# Patient Record
Sex: FEMALE | Race: WHITE | Employment: FULL TIME | URBAN - METROPOLITAN AREA
[De-identification: names, ages, dates, MRNs, and addresses within clinical notes are randomized per-mention and may not be internally consistent; named-entity substitution may affect disease eponyms.]

---

## 2018-08-13 ENCOUNTER — HOSPITAL ENCOUNTER (OUTPATIENT)
Dept: NUTRITION | Age: 28
Discharge: HOME | End: 2018-08-13
Payer: COMMERCIAL

## 2018-08-13 PROCEDURE — 97802 MEDICAL NUTRITION INDIV IN: CPT

## 2018-08-13 NOTE — PROGRESS NOTES
[address] [place] [address] 51, 45 [facility], [place], 70 [facility]  Phone: [phone]  Fax: [phone]   Nutrition Assessment  Medical Nutrition Therapy   Outpatient Initial Evaluation         Patient Name: Ki Mcintosh : 1990   Treatment Diagnosis: Morbid obesity   Referral Source: Stefani Burt MD [facility]): 2018     Gender: female Age: 33 y.o. Ht: 64 in Wt: 240  lb  kg   BMI: 41.2 BMR   Male  BMR Female 1800   Anthropometrics Assessment: BMI indicates obese III. Past Medical History includes: Adrenal hyperplasia, morbid obesity     Pertinent Medications:   Vit D, birth control     Biochemical Data:        Subjective/Assessment:   Pt in today r/t unintentional wt gain. Pt states that she has gained 20# in the past 2 years after moving to the [facility] from [place]. She lives with her boyfriend and he cooks dinner most nights of the week. She states that she has a stressful FT career. She is currently recovering from a broken ankle. Prior to the injury, pt was exercising with a  1 day/wk, which is the only physical activity she gets. Pt and boyfriend have fast food twice per week. She drinks coffee with creamer, water/sparkling water, and <1 x per week she has a glass of wine or 1 beer. Current Eating Patterns: B: 7:30 am - 3 eggs or protein shake  L: 1:30 pm - leftovers or protein shake  D: after work - chicken and vegetable stir howard  Sometimes pt will have a mini Joe's cup as a snack or dessert     Estimate Needs   Calories:  1800 Protein: 135 Carbs: 158 Fat: 70   Kcal/day  g/day  g/day  g/day        percent: 30  35  35               Education & Recommendations provided: Discussed the Healthy Plate Method and appropriate portion sizes of different food groups. Explained the importance of combining carbohydrates with protein at meals and reviewed foods that contain each nutrient.  Emphasized the importance of consistent meal time intervals throughout the day to improve metabolism. Explained calorie density and empty calories to assist pt with understanding portion sizes and limiting excess calories. Encouraged pt to begin tracking food into nutrition krystina. Handouts Provided: [x]  Carbohydrates  [x]  Protein  []  Fiber  []  Serving Sizes  []  Meal and Snack Ideas  []  Food Journals []  Diabetes  []  Cholesterol  []  Sodium  [x]  Gen Nutr Guidelines  []  SBGM Guidelines  [x]  Others: Snacks   Information Reviewed with: Pt   Readiness to Change Stage: []  Pre-contemplative    [x]  Contemplative  []  Preparation               []  Action                  []  Maintenance   Potential Barriers to Learning: []  Decline in memory    []  Language barrier   []  Other:  []  Emotional                  []  Limited mobility  []  Lack of motivation     [] Vision, hearing or cognitive impairment   Expected Compliance: Fair/Poor due to pt being taught and still carrying the belief that she needs to severely limit calories (800 kcals less than BMR per prior doctor) to lose weight.      Nutritional Goal - To promote lifestyle changes to result in:    [x]  Weight loss  []  Improved diabetic control  []  Decreased cholesterol levels  []  Decreased blood pressure  []  Weight maintenance []  Preventing any interactions associated with food allergies  []  Adequate weight gain toward goal weight  []  Other:        Patient Goals:  PIJRL goals      Dietitian Signature: Cirilo Barrow MS, RD Date: 8/13/2018   Follow-up: Wed Sep 5th @ 6:30 pm Time: 9:50 AM

## 2018-09-10 ENCOUNTER — HOSPITAL ENCOUNTER (OUTPATIENT)
Dept: NUTRITION | Age: 28
Discharge: HOME | End: 2018-09-10
Payer: COMMERCIAL

## 2018-09-10 PROCEDURE — 97803 MED NUTRITION INDIV SUBSEQ: CPT

## 2018-09-10 NOTE — PROGRESS NOTES
NUTRITION  FOLLOW-UP TREATMENT NOTE Patient Name: Oscar Oquendo         Date: 9/10/2018 : 1990    YES/NO Patient  Verified Diagnosis: Morbid obesity In time:   8:00            Out time:   8:30 Total Treatment Time (min):   30 min [address] Changes in medication or medical history? Any new allergies, surgeries or procedures? NO    If yes, update Summary List  
EEN changed to 1500 kcals/day, 113 g protein/day 131 g CHO/day and 58 g/fat per day. Pt in today for follow up. Pt has a dx of adrenal hyperplasia which mimicks PCOS. She has not changed her diet at all, but she did begin tracking her nutrition in [employer] krystina. She states that her kcals are widely varied between 6907-3387. She also went on vacation since last visit. She is a member of [employer] and plans to look into yoga classes offered there. She also plans to set up her appt with PT r/t her broken ankle. See initial note for food recall, as pt has not changed anything in her diet. Current Wt: 244 (w/out boot) Previous Wt: 240 (w/boot) Wt Change: +4#+ Achievement of Goals: 1. Limit CHO's to 30-45 g/meal and 15-20 g/snack = not met, continue 2. Track meals/snacks for 4 out of 7 days = met, continue 3. Always balance CHO's with protein = not met, continue 4. Eat meal or snack within 1-2 hours of waking and every 3-4 hrs thereafter = met, continue Patient Education:  [x]  Review current plan with patient [x]  Other: Encouraged pt to seek out activities that she can do while recovering from broken ankle such as yoga, which she states is offered at her gym and to send A1C results for evaluation. Handouts/ Information Provided: []  Carbohydrates 
[]  Protein []  Fiber 
[]  Serving Sizes []  Fluids 
[]  General guidelines []  Diabetes []  Cholesterol 
[]  Sodium []  SBGM []  Food Journals 
[x]  Others: Healthy tips for eating out New Patient Goals: 1. Continue tracking in [employer] 3-4 days/wk 2. Stick within ~100 kcals of goal (1500/day) 3. Send A1C results by e-mail PLAN [x]  Continue on current plan []  Follow-up PRN  
[]  Discharge due to :   
[x]  Next appt: Fri Oct 19th@ 8 am  
 
Dietitian: Chencho Amaya MS, RD Date: 9/10/2018 Time: 8:40 AM

## 2018-10-19 ENCOUNTER — APPOINTMENT (OUTPATIENT)
Dept: NUTRITION | Age: 28
End: 2018-10-19

## 2018-10-19 ENCOUNTER — HOSPITAL ENCOUNTER (OUTPATIENT)
Dept: PHYSICAL THERAPY | Age: 28
Discharge: HOME | End: 2018-10-19
Payer: COMMERCIAL

## 2018-10-19 PROCEDURE — 97110 THERAPEUTIC EXERCISES: CPT

## 2018-10-19 PROCEDURE — 97161 PT EVAL LOW COMPLEX 20 MIN: CPT

## 2018-10-19 NOTE — PROGRESS NOTES
[address] PHYSICAL THERAPY 
[address] [place] [address], [place] 201,[facility], 70 [facility] - Phone: [phone]  Fax: [phone] PLAN OF CARE / STATEMENT OF MEDICAL NECESSITY FOR PHYSICAL THERAPY SERVICES Patient Name: Junior Painter : 1990 Medical  
Diagnosis: Left foot pain [M79.672] Treatment Diagnosis: Left foot pain [M79.672] Onset Date: 2018 Referral Source: Lieutenant Nathaly MD Start of Care [facility] [place]): 10/19/2018 Prior Hospitalization: See medical history Provider #: [id number] Prior Level of Function: Unlimited walking/stairs/work duties. Recreational exercise at gym 2 x/wk including running on TM, weight lifting Comorbidities: asthma Medications: Verified on Patient Summary List  
The Plan of Care and following information is based on the information from the initial evaluation.  
=========================================================================================== Assessment / key information:  Pt is a 33y.o. year old female with subjective complaints of L foot pain s/p anterior process fracture of calcaneus 18. Pt states in boot and NWB x 3 months. Progressive wb'in in boot x 1 month. Gradual WBAT in sneaker x 1 month. Now presents with c/o weakness and stiffness limiting walking, stairs (especially descending), and has been unable to resume normal gym activities. Current pain is rated as 0 to 8/10. FOTO score= 60/100 indicating 40% impairment to functional activities. Today's evaulation is significant for: Gait pattern grossly WNL short distances. Pt with impaired strength to L ankle: DF 4+, PF 4/5, Inv/ev 4/5. Impaired A/PROM left ankle:  DF 2/4 degrees, PF 62/66, inv 44/45*, ev 18/18*. Impaired proprioception and ankle stability as per SLS L 2 to 14 seconds, eyes open. Ligamentous laxity testing WNL. Min TTP to lateral calcaneocuboid joint line. These findings are supportive for diagnosis of L foot/ankle pain. Pt will be a good candidate for skilled PT to address these impairments and promote return to normal ADLs and functional mobility for improved quality of life. 
=========================================================================================== Eval Complexity: History MEDIUM  Complexity : 1-2 comorbidities / personal factors will impact the outcome/ POC ;  Examination  MEDIUM Complexity : 3 Standardized tests and measures addressing body structure, function, activity limitation and / or participation in recreation ; Presentation LOW Complexity : Stable, uncomplicated ;  Decision Making MEDIUM Complexity : FOTO score of 26-74; Overall Complexity LOW Problem List: pain affecting function, decrease ROM, decrease strength, impaired gait/ balance, decrease ADL/ functional abilitiies, decrease activity tolerance and decrease flexibility/ joint mobility Treatment Plan may include any combination of the following: Therapeutic exercise, Therapeutic activities, Neuromuscular re-education, Physical agent/modality, Gait/balance training, Manual therapy, Patient education, Functional mobility training and Stair training Patient / Family readiness to learn indicated by: asking questions, trying to perform skills and interest 
Persons(s) to be included in education: patient (P) Barriers to Learning/Limitations: None Measures taken:   
Patient Goal (s): \"increase range of motion and ability to walk longer distances\" Patient self reported health status: good Rehabilitation Potential: good ? Short Term Goals: To be accomplished in  2  weeks: 1. Pt will be educated in appropriate HEP to decrease pain, increase ROM/strength and return pt to PLOF. 2. Pt will increase L DF AROM to >/= 15 degrees in order to promote normalized reciprocal gait descending stairs.  
3. Patient will report at least 50% functional improvement with standing, walking ADL's  
 
? Long Term Goals: To be accomplished in  4-6  weeks: 1. Pt will improve FOTO score to >/= 75 to demo a significant improvement in functional activity tolerance. 2. Pt will achieve >/= +5 GROC in order to promote increased activity tolerance. 3. Patient to demonstrate 30 sec of L SLS on firm surface in order to improve ease with ambulation on uneven terrains. 4. Pt will increase L ankle strength to 5/5 PF in order to promote pain free, reciprocal gait pattern descending flight of stairs. Frequency / Duration:   Patient to be seen  2  times per week for 4-6  weeks: 
Patient / Caregiver education and instruction: activity modification and exercises G-Codes (GP): n/a Therapist Signature: Anitha Ahn PT Date: 10/19/2018 Certification Period: n/a Time: 7:53 AM  
=========================================================================================== I certify that the above Physical Therapy Services are being furnished while the patient is under my care. I agree with the treatment plan and certify that this therapy is necessary. Physician Signature:       Date:      Time:  Please sign and return to [facility] at [facility], [facility]. or you may fax the signed copy to [phone]. Thank you.

## 2018-10-19 NOTE — PROGRESS NOTES
PHYSICAL THERAPY - DAILY TREATMENT NOTE Patient Name: Virginia Capellan        Date: 10/19/2018 : 1990   yes Patient  Verified Visit #:   1     Insurance: Payor: GIANFRANCO / Plan: Loyd Joiner PPO / Product Type: PPO / In time: 7:55 Out time: 8:35 Total Treatment Time: 40 Medicare/[facility] Time Tracking (below) Total Timed Codes (min):  40 1:1 Treatment Time:  40 TREATMENT AREA =  Left foot pain [M79.672] SUBJECTIVE Pain Level (on 0 to 10 scale):  0  / 10 Medication Changes/New allergies or changes in medical history, any new surgeries or procedures?    no  If yes, update Summary List  
Subjective Functional Status/Changes:  []  No changes reported See POC OBJECTIVE See exam on chart for details on objective findings 10 min Therapeutic Exercise:  [x]  See flow sheet Rationale:      increase ROM and increase strength to improve the patients ability to perform functional mobility/ADLs and attain goals. 3 min Manual Therapy: gastroc stretch with concurrent A/P TC mob Rationale:      decrease pain, increase ROM and increase tissue extensibility to improve patient's ability to perform functional mobility/ADLs and attain goals. min Patient Education:  yes  Reviewed HEP []  Progressed/Changed HEP based on: Other Objective/Functional Measures: 
 
See POC Post Treatment Pain Level (on 0 to 10) scale:   0  / 10 ASSESSMENT Assessment/Changes in Function:  
 
See POC Pt unable to tolerate standing gastroc stretch due to c/o anterior ankle pain; abolished ankle pain and good stretch reported with manual stretching/mob. []  See Progress Note/Recertification Patient will continue to benefit from skilled PT services to modify and progress therapeutic interventions, address functional mobility deficits, address ROM deficits, address strength deficits, analyze and address soft tissue restrictions, analyze and cue movement patterns and analyze and modify body mechanics/ergonomics to attain remaining goals. Progress toward goals / Updated goals: 
See POC PLAN 
[]  Upgrade activities as tolerated yes Continue plan of care  
[]  Discharge due to :   
[]  Other:   
 
Therapist: Yeny Crow. Jimy, RAJI Date: 10/19/2018 Time: 7:53 AM  
 
Future Appointments Date Time Provider Carlos Sosa 10/23/2018  7:00 AM Shonna Rosa, [facility]  
11/2/2018  6:30 AM Shonna Rosa, [facility]  
11/5/2018  7:00 AM [place], [facility]  
11/7/2018  7:00 AM [place] Rosa, [facility]  
11/12/2018  7:00 AM [place] Rosa, [facility]  
11/14/2018  7:00 AM Christofer Chandler, [facility]

## 2018-10-23 ENCOUNTER — HOSPITAL ENCOUNTER (OUTPATIENT)
Dept: PHYSICAL THERAPY | Age: 28
Discharge: HOME | End: 2018-10-23
Payer: COMMERCIAL

## 2018-10-23 PROCEDURE — 97140 MANUAL THERAPY 1/> REGIONS: CPT

## 2018-10-23 PROCEDURE — 97110 THERAPEUTIC EXERCISES: CPT

## 2018-10-23 NOTE — PROGRESS NOTES
PHYSICAL THERAPY - DAILY TREATMENT NOTE Patient Name: Margarita Moserster        Date: 10/23/2018 : 1990   YES Patient  Verified Visit #:      12  Insurance: Payor: GIANFRANCO / Plan: Butch Stallings PPO / Product Type: PPO / In time: 700 Out time: 750 Total Treatment Time: 50 Medicare Time Tracking (below)/BCBS Total Timed Codes (min):  50 1:1 Treatment Time:  50 TREATMENT AREA =  Left foot pain [M79.672] SUBJECTIVE Pain Level (on 0 to 10 scale):  0  / 10 Medication Changes/New allergies or changes in medical history, any new surgeries or procedures? NO    If yes, update Summary List  
Subjective Functional Status/Changes:  []  No changes reported Did the HEP, no questions about them. wnet to [place] on Friday and I was able to walk most of the park and then I was having some pain. OBJECTIVE 35 min Therapeutic Exercise:  [x]  See flow sheet Rationale:      increase ROM, increase strength, improve coordination and improve balance to improve the patients ability to perform functional mobility activities with decrease c/o symptoms. 15 min Manual Therapy: CFM lateral malleoli, fib mob, calcaneal mob, forefoot mob, DTM lateral gastroc/peroneal.  
Rationale:      decrease pain, increase ROM and increase tissue extensibility to improve patient's ability to perform functional mobility activities with decrease c/o symptoms. min Patient Education:  YES  Reviewed HEP []  Progressed/Changed HEP based on: Other Objective/Functional Measures: No change in functional measurements today. Post Treatment Pain Level (on 0 to 10) scale:   0  / 10 ASSESSMENT Assessment/Changes in Function:  
 
Overall good tolerance to all therapeutic interventions for first treatment session 
  
[]  See Progress Note/Recertification Patient will continue to benefit from skilled PT services to modify and progress therapeutic interventions, address functional mobility deficits, address ROM deficits, address strength deficits, analyze and address soft tissue restrictions and analyze and cue movement patterns to attain remaining goals. Progress toward goals / Updated goals: No change toward goals today. PLAN 
[]  Upgrade activities as tolerated YES Continue plan of care  
[]  Discharge due to :   
[]  Other:   
 
Therapist: Pat Leahy Date: 10/23/2018 Time: 6:38 AM  
 
 
Future Appointments Date Time Provider Carlos Sosa 10/23/2018  7:00 AM Roberta Brito [facility]  
11/2/2018  6:30 AM Roberta Brito, [facility]  
11/5/2018  7:00 AM Roberta Brito [facility]  
11/7/2018  7:00 AM Roberta Brito [facility]  
11/12/2018  7:00 AM Roberta Brito, [facility]  
11/14/2018  7:00 AM Ted Krueger, [facility]

## 2018-10-26 ENCOUNTER — HOSPITAL ENCOUNTER (OUTPATIENT)
Dept: PHYSICAL THERAPY | Age: 28
Discharge: HOME | End: 2018-10-26
Payer: COMMERCIAL

## 2018-10-26 PROCEDURE — 97110 THERAPEUTIC EXERCISES: CPT

## 2018-10-26 PROCEDURE — 97140 MANUAL THERAPY 1/> REGIONS: CPT

## 2018-10-26 NOTE — PROGRESS NOTES
PHYSICAL THERAPY - DAILY TREATMENT NOTE Patient Name: Laurence Godoy        Date: 10/26/2018 : 1990   YES Patient  Verified Visit #:   3  of   12  Insurance: Payor: GIANFRANCO / Plan: Kirstin Andrade PPO / Product Type: PPO / In time: 2:01 PM Out time: 3:10 PM  
Total Treatment Time: 71 Medicare Time Tracking (below)/BCBS Total Timed Codes (min):  N/A 1:1 Treatment Time:  N/A  
TREATMENT AREA =  Left foot pain [M79.672] SUBJECTIVE Pain Level (on 0 to 10 scale):  0.5 / 10 Medication Changes/New allergies or changes in medical history, any new surgeries or procedures? NO    If yes, update Summary List  
Subjective Functional Status/Changes:  []  No changes reported Patient reports that she has no pain, however \"notices something\" at the [facility] joint and lateral malleolus when going downstairs OBJECTIVE Modalities Rationale:     decrease inflammation and decrease pain to improve patient's ability to perform pain free functional ADLs. min [] Estim, type/location:   
                                 []  att     []  unatt     []  w/US     []  w/ice    []  w/heat 
 min []  Mechanical Traction: type/lbs   
               []  pro   []  sup   []  int   []  cont    []  before manual    []  after manual  
 min []  Ultrasound, settings/location:    
 min []  Iontophoresis w/ dexamethasone, location:   
                                           []  take home patch       []  in clinic  
10 min [x]  Ice     []  Heat    location/position: cryoboot to L ankle in longsit post-session  
 min []  Vasopneumatic Device, press/temp:   
 min []  Other:   
[] Skin assessment post-treatment (if applicable):   
[]  intact    []  redness- no adverse reaction    
[]redness  adverse reaction:     
44 min Therapeutic Exercise:  [x]  See flow sheet Rationale:      increase ROM, increase strength, improve coordination and improve balance to improve the patients ability to perform functional mobility activities with decrease c/o symptoms. 15 min Manual Therapy: CFM to L lateral malleoli, fib mob, calcaneal mob, forefoot mob, talus mob; DTM to L lateral gastroc/peroneal.  
Rationale:      decrease pain, increase ROM and increase tissue extensibility to improve patient's ability to perform prolonged standing/walking activities. min Patient Education:  YES  Reviewed HEP []  Progressed/Changed HEP based on: Other Objective/Functional Measures: Added self DF mob with powerband and mini squats at table Attempted to perform 4 inch step downs; noted increase discomfort in lateral malleolus area Post Treatment Pain Level (on 0 to 10) scale:   0  / 10 ASSESSMENT Assessment/Changes in Function:  
 
Demonstrated good tolerance with PREs. Noted good relief of initial soreness and decrease subjective pain following cryoboot. []  See Progress Note/Recertification Patient will continue to benefit from skilled PT services to modify and progress therapeutic interventions, address functional mobility deficits, address ROM deficits, address strength deficits, analyze and address soft tissue restrictions and analyze and cue movement patterns to attain remaining goals. Progress toward goals / Updated goals: 
Minimal progress towards STG #2 PLAN 
[]  Upgrade activities as tolerated YES Continue plan of care  
[]  Discharge due to :   
[]  Other:   
 
Therapist: JONNA Arellano Date: 10/26/2018 Time: 4:19 PM  
 
 
Future Appointments Date Time Provider Carlos Sosa 10/26/2018  2:00 PM [facility]  
11/2/2018  6:30 AM Fayetta Duverney, [facility]  
11/5/2018  7:00 AM Fayetta Duverney, [facility]  
11/7/2018  7:00 AM Fayetta Duverney, [facility]  
11/12/2018  7:00 AM Fayetta Duverney, [facility]  
11/14/2018  7:00 AM Feroz Healy [facility]

## 2018-11-02 ENCOUNTER — HOSPITAL ENCOUNTER (OUTPATIENT)
Dept: PHYSICAL THERAPY | Age: 28
Discharge: HOME | End: 2018-11-02
Payer: COMMERCIAL

## 2018-11-02 PROCEDURE — 97140 MANUAL THERAPY 1/> REGIONS: CPT

## 2018-11-02 PROCEDURE — 97110 THERAPEUTIC EXERCISES: CPT

## 2018-11-02 NOTE — PROGRESS NOTES
PHYSICAL THERAPY - DAILY TREATMENT NOTE Patient Name: Rachel Mai        Date: 2018 : 1990   YES Patient  Verified Visit #:      12  Insurance: Payor: GIANFRANCO / Plan: Malon Star PPO / Product Type: PPO / In time: 630 Out time: 720 Total Treatment Time: 50 Medicare Time Tracking (below)/BCBS Total Timed Codes (min):   1:1 Treatment Time:    
TREATMENT AREA =  Left foot pain [M79.672] SUBJECTIVE Pain Level (on 0 to 10 scale):  1  / 10 Medication Changes/New allergies or changes in medical history, any new surgeries or procedures? NO    If yes, update Summary List  
Subjective Functional Status/Changes:  []  No changes reported Last session I came in an it's been sore on the outside of my ankle. I feel like i've been having better movement. OBJECTIVE Modalities Rationale:     decrease inflammation and decrease pain to improve patient's ability to perform pain free functional ADLs. min [] Estim, type/location:   
                                 []  att     []  unatt     []  w/US     []  w/ice    []  w/heat 
 min []  Mechanical Traction: type/lbs   
               []  pro   []  sup   []  int   []  cont    []  before manual    []  after manual  
 min []  Ultrasound, settings/location:    
 min []  Iontophoresis w/ dexamethasone, location:   
                                           []  take home patch       []  in clinic  
 min []  Ice     []  Heat    location/position:   
 min []  Vasopneumatic Device, press/temp:   
 min []  Other:   
[] Skin assessment post-treatment (if applicable):   
[]  intact    []  redness- no adverse reaction    
[]redness  adverse reaction:     
30 min Therapeutic Exercise:  [x]  See flow sheet Rationale:      increase ROM, increase strength, improve coordination and improve balance to improve the patients ability to perform pain free functional ADLs. 20 min Manual Therapy: CFM to L lateral malleoli, fib mob, calcaneal mob, forefoot mob, talus mob; DTM to L lateral gastroc/peroneal.  
Rationale:      decrease pain, increase ROM and increase tissue extensibility to improve patient's ability to perform pain free functional ADLs. min Patient Education:  YES  Reviewed HEP []  Progressed/Changed HEP based on: Other Objective/Functional Measures: 
 
Increase mobility of forefoot and talus to allow for increase of DF with ambulation Post Treatment Pain Level (on 0 to 10) scale:   0  / 10 ASSESSMENT Assessment/Changes in Function:  
 
Improved gait with decrease compensation for previous lack of ankle motion. []  See Progress Note/Recertification Patient will continue to benefit from skilled PT services to modify and progress therapeutic interventions, address functional mobility deficits, address ROM deficits, address strength deficits, analyze and address soft tissue restrictions and analyze and cue movement patterns to attain remaining goals. Progress toward goals / Updated goals: 
Progressing slowly toward goals. PLAN 
[]  Upgrade activities as tolerated YES Continue plan of care  
[]  Discharge due to :   
[]  Other:   
 
Therapist: Suzzanne Bernheim Date: 11/2/2018 Time: 6:02 AM  
 
 
Future Appointments Date Time Provider Carlos Sosa 11/2/2018  6:30 AM Merced Sandoval PTA [facility]  
11/5/2018  7:00 AM Merced Sandoval [facility]  
11/7/2018  7:00 AM Merced Sandoval PTA [facility]  
11/12/2018  7:00 AM Merced Sandoval [facility]  
11/14/2018  7:00 AM Consuelo Martínez [facility]

## 2018-11-05 ENCOUNTER — HOSPITAL ENCOUNTER (OUTPATIENT)
Dept: PHYSICAL THERAPY | Age: 28
Discharge: HOME | End: 2018-11-05
Payer: COMMERCIAL

## 2018-11-05 PROCEDURE — 97140 MANUAL THERAPY 1/> REGIONS: CPT

## 2018-11-05 PROCEDURE — 97110 THERAPEUTIC EXERCISES: CPT

## 2018-11-05 NOTE — PROGRESS NOTES
PHYSICAL THERAPY - DAILY TREATMENT NOTE Patient Name: Theodis Meckel        Date: 2018 : 1990   YES Patient  Verified Visit #:      12  Insurance: Payor: GIANFRANCO / Plan: Leslie Preciado PPO / Product Type: PPO / In time: 700 Out time: 755 Total Treatment Time: 54 Medicare Time Tracking (below)/BCBS Total Timed Codes (min):   1:1 Treatment Time:    
TREATMENT AREA =  Left foot pain [M79.672] SUBJECTIVE Pain Level (on 0 to 10 scale):  0  / 10 Medication Changes/New allergies or changes in medical history, any new surgeries or procedures? NO    If yes, update Summary List  
Subjective Functional Status/Changes:  []  No changes reported Felt good after alst session and the ankle was fine the rest of the day. No problmems over the weekend. OBJECTIVE 40 min Therapeutic Exercise:  [x]  See flow sheet Rationale:      increase ROM, increase strength, improve coordination and improve balance to improve the patients ability to perform pain free functional ADLs. 15 min Manual Therapy: CFM to L lateral malleoli, fib mob, calcaneal mob, forefoot mob, talus mob; DTM to L lateral gastroc/peroneal.  
Rationale:      decrease pain, increase ROM and increase tissue extensibility to improve patient's ability to perform pain free functional ADLs. min Patient Education:  YES  Reviewed HEP []  Progressed/Changed HEP based on: Other Objective/Functional Measures: 
 
Patient reports ~75% overall improvement with all functional mobility activities 12 degrees ankle df 
  
Post Treatment Pain Level (on 0 to 10) scale:   0  / 10 ASSESSMENT Assessment/Changes in Function:  
 
Patient negotiated one flight of stairs with reciprocal pattern and no handrails. Slight increase of difficulty with descending. []  See Progress Note/Recertification Patient will continue to benefit from skilled PT services to modify and progress therapeutic interventions, address functional mobility deficits, address ROM deficits, address strength deficits, analyze and address soft tissue restrictions and analyze and cue movement patterns to attain remaining goals. Progress toward goals / Updated goals: · Short Term Goals: To be accomplished in  2  weeks: 1. Pt will be educated in appropriate HEP to decrease pain, increase ROM/strength and return pt to PLOF. Achieved 2. Pt will increase L DF AROM to >/= 15 degrees in order to promote normalized reciprocal gait descending stairs. progressing well 3. Patient will report at least 50% functional improvement with standing, walking ADL's : achieved PLAN 
[]  Upgrade activities as tolerated YES Continue plan of care  
[]  Discharge due to :   
[]  Other:   
 
Therapist: Cayetano Green Date: 11/5/2018 Time: 6:28 AM  
 
 
Future Appointments Date Time Provider Carlos Sosa 11/5/2018  7:00 AM Mariam Mandel [facility]  
11/7/2018  7:00 AM Mariam Mandel [facility]  
11/12/2018  7:00 AM Mariam Mandel [facility]  
11/14/2018  7:00 AM Hebert Quispe [facility]

## 2018-11-07 ENCOUNTER — HOSPITAL ENCOUNTER (OUTPATIENT)
Dept: PHYSICAL THERAPY | Age: 28
Discharge: HOME | End: 2018-11-07
Payer: COMMERCIAL

## 2018-11-07 PROCEDURE — 97110 THERAPEUTIC EXERCISES: CPT

## 2018-11-07 PROCEDURE — 97140 MANUAL THERAPY 1/> REGIONS: CPT

## 2018-11-07 NOTE — PROGRESS NOTES
PHYSICAL THERAPY - DAILY TREATMENT NOTE Patient Name: Gavino Malik        Date: 2018 : 1990   YES Patient  Verified Visit #:     Insurance: Payor: Paddy Horacioadrian / Plan: Moody Kelly PPO / Product Type: PPO / In time: 700 Out time: 800 Total Treatment Time: 60 Medicare Time Tracking (below)/BCBS Total Timed Codes (min):   1:1 Treatment Time:    
TREATMENT AREA =  Left foot pain [M79.672] SUBJECTIVE Pain Level (on 0 to 10 scale):  0  / 10 Medication Changes/New allergies or changes in medical history, any new surgeries or procedures? NO    If yes, update Summary List  
Subjective Functional Status/Changes:  []  No changes reported Doctor said I don't have to see him anymore and that I can just continue with therapy for now OBJECTIVE 45 min Therapeutic Exercise:  [x]  See flow sheet Rationale:      increase ROM, increase strength, improve coordination and improve balance to improve the patients ability to perform pain free functional ADLs. 15 min Manual Therapy: CFM to L lateral malleoli, fib mob, calcaneal mob, forefoot mob, talus mob; DTM to L lateral gastroc/peroneal.  
Rationale:      decrease pain, increase ROM and increase tissue extensibility to improve patient's ability to perform pain free functional ADLs. min Patient Education:  YES  Reviewed HEP []  Progressed/Changed HEP based on: Other Objective/Functional Measures: 
 
GROC +5 a good deal better FOTO: 74 Ankle DF ROM AROM/PROM: 15/19 MMT strength 5/5 in all directions. Post Treatment Pain Level (on 0 to 10) scale:   0  / 10 ASSESSMENT Assessment/Changes in Function: Able to perform all general functional mobility activities. []  See Progress Note/Recertification Patient will continue to benefit from skilled PT services to modify and progress therapeutic interventions, address functional mobility deficits, address ROM deficits, address strength deficits, analyze and address soft tissue restrictions and analyze and cue movement patterns to attain remaining goals. Progress toward goals / Updated goals: · Short Term Goals: To be accomplished in  2  weeks: 1. Pt will be educated in appropriate HEP to decrease pain, increase ROM/strength and return pt to PLOF.  Achieved 2. Pt will increase L DF AROM to >/= 15 degrees in order to promote normalized reciprocal gait descending stairs. Achieved 3. Patient will report at least 50% functional improvement with standing, walking ADL's Achieved 
  
· Long Term Goals: To be accomplished in  4-6  weeks: 1. Pt will improve FOTO score to >/= 75 to demo a significant improvement in functional activity tolerance. Progressing 2. Pt will achieve >/= +5 GROC in order to promote increased activity tolerance. Achieved 3. Patient to demonstrate 30 sec of L SLS on firm surface in order to improve ease with ambulation on uneven terrains. Achieved 4. Pt will increase L ankle strength to 5/5 PF in order to promote pain free, reciprocal gait pattern descending flight of stairs. Achieved 
  
 
 
 
PLAN 
[]  Upgrade activities as tolerated YES Continue plan of care  
[]  Discharge due to :   
[]  Other:   
 
Therapist: Jia Haines Date: 11/7/2018 Time: 6:27 AM  
 
 
Future Appointments Date Time Provider Carlos Sosa 11/7/2018  7:00 AM Ger Alan [facility]  
11/12/2018  7:00 AM Ger Alan [facility]  
11/14/2018  7:00 AM Sammy Vaca [facility]

## 2018-11-12 ENCOUNTER — HOSPITAL ENCOUNTER (OUTPATIENT)
Dept: PHYSICAL THERAPY | Age: 28
End: 2018-11-12
Payer: COMMERCIAL

## 2018-11-14 ENCOUNTER — HOSPITAL ENCOUNTER (OUTPATIENT)
Dept: PHYSICAL THERAPY | Age: 28
Discharge: HOME | End: 2018-11-14
Payer: COMMERCIAL

## 2018-11-14 PROCEDURE — 97110 THERAPEUTIC EXERCISES: CPT

## 2018-11-14 PROCEDURE — 97140 MANUAL THERAPY 1/> REGIONS: CPT

## 2018-11-14 NOTE — PROGRESS NOTES
PHYSICAL THERAPY - DAILY TREATMENT NOTE Patient Name: Colton Ramirez        Date: 2018 : 1990   YES Patient  Verified Visit #:     Insurance: Payor: GIANFRANCO / Plan: Authur Scheuermann PPO / Product Type: PPO / In time: 700 Out time: 740 Total Treatment Time: 40 Medicare Time Tracking (below)/BCBS Total Timed Codes (min):   1:1 Treatment Time:    
TREATMENT AREA =  Left foot pain [M79.672] SUBJECTIVE Pain Level (on 0 to 10 scale):  0  / 10 Medication Changes/New allergies or changes in medical history, any new surgeries or procedures? NO    If yes, update Summary List  
Subjective Functional Status/Changes:  []  No changes reported Reports no problems over the weekend. OBJECTIVE 30 min Therapeutic Exercise:  [x]  See flow sheet Rationale:      increase ROM, increase strength, improve coordination and improve balance to improve the patients ability to perform pain free functional ADLs. 10 min Manual Therapy: Distal fib head mob, then taping for stability. Rationale:      decrease pain, increase ROM and increase tissue extensibility to improve patient's ability to perform pain free functional ADLs. min Patient Education:  YES  Reviewed HEP []  Progressed/Changed HEP based on: Other Objective/Functional Measures: 
 
Reports ~ 90% overall improvement with all functional mobility activities. Good mobility of forefoot and rear foot. Slight drop of great toe with subtalar neutral, rearfoot neutral. 
  
Post Treatment Pain Level (on 0 to 10) scale:   0  / 10 ASSESSMENT Assessment/Changes in Function:  
 
Reports no significant difficulty with all functional mobility activiites. Will fatigue if standing or walking for long durations (>1 hour). []  See Progress Note/Recertification Patient will continue to benefit from skilled PT services to modify and progress therapeutic interventions, address functional mobility deficits, address ROM deficits, address strength deficits, analyze and address soft tissue restrictions and analyze and cue movement patterns to attain remaining goals. Progress toward goals / Updated goals: Anticipate DC next visit. PLAN 
[]  Upgrade activities as tolerated YES Continue plan of care  
[]  Discharge due to :   
[]  Other:   
 
Therapist: Lor Bernard Date: 11/14/2018 Time: 6:28 AM  
 
 
Future Appointments Date Time Provider Carlos Sosa 11/14/2018  7:00 AM Benito Garcia PTA [facility]

## 2018-11-20 ENCOUNTER — HOSPITAL ENCOUNTER (OUTPATIENT)
Dept: PHYSICAL THERAPY | Age: 28
Discharge: HOME | End: 2018-11-20
Payer: COMMERCIAL

## 2018-11-20 PROCEDURE — 97110 THERAPEUTIC EXERCISES: CPT

## 2018-11-20 NOTE — PROGRESS NOTES
PHYSICAL THERAPY - DAILY TREATMENT NOTE Patient Name: Junior Painter        Date: 2018 : 1990   YES Patient  Verified Visit #:     Insurance: Payor: GIANFRANCO / Plan: Mohan Gonzalez PPO / Product Type: PPO / In time: 700 Out time: 730 Total Treatment Time: 30 Medicare Time Tracking (below)/BCBS Total Timed Codes (min):   1:1 Treatment Time:    
TREATMENT AREA =  Left foot pain [M79.672] SUBJECTIVE Pain Level (on 0 to 10 scale):  0  / 10 Medication Changes/New allergies or changes in medical history, any new surgeries or procedures? NO    If yes, update Summary List  
Subjective Functional Status/Changes:  []  No changes reported Reports slight discomfort along PF with extensive prolonged walking. OBJECTIVE 30 min Therapeutic Exercise:  [x]  See flow sheet Rationale:      increase ROM, increase strength, improve coordination and improve balance to improve the patients ability to perform pain free functional ADLs. min Patient Education:  YES  Reviewed HEP []  Progressed/Changed HEP based on: Other Objective/Functional Measures: 
 
Pain level 0/10 with all functional mobility activities Reports ~95% overall improvement with all functional mobility activities. (L) HR 10x. MMT 5/5 in all directions GROC:+7 a very great deal better FOTO: 79 
  
Post Treatment Pain Level (on 0 to 10) scale:   0  / 10 ASSESSMENT Assessment/Changes in Function: 1. Pt will improve FOTO score to >/= 75 to demo a significant improvement in functional activity tolerance. Achieved 2. Pt will achieve >/= +5 GROC in order to promote increased activity tolerance. Achieved 3. Patient to demonstrate 30 sec of L SLS on firm surface in order to improve ease with ambulation on uneven terrains. achieved 4. Pt will increase L ankle strength to 5/5 PF in order to promote pain free, reciprocal gait pattern descending flight of stairs. Achieved []  See Progress Note/Recertification Progress toward goals / Updated goals: 
See DC note PLAN 
[]  Upgrade activities as tolerated no Continue plan of care [x]  Discharge due to : Program completed  
[]  Other:   
 
Therapist: Britney Deal Date: 11/20/2018 Time: 5:58 AM  
 
 
Future Appointments Date Time Provider Carlos Sosa 11/20/2018  7:00 AM Camilla Garcia PTA [facility]

## 2018-12-18 NOTE — PROGRESS NOTES
[address] PHYSICAL THERAPY  [address] [place] [address], [address],[facility], [facility] - Phone: [phone]  Fax: [phone]   DISCHARGE SUMMARY  Patient Name: Jeffery Aguirre : 1990   Treatment/Medical Diagnosis: Pain in left ankle [M25.572]     Referral Source: Suraj Olivarez MD     Date of Initial Visit: 10/19/2018 Attended Visits: 8 Missed Visits: 1     [address] has been seen at our clinic 2-3x/wk for a total of 8 visits. Pt treatment has consisted of aerobic warm-up with stationary bike, therapeutic exercise for ankle ROM, ankle stability, modalities prn and manual therapy (CFM to L lateral malleoli, fib mob, calcaneal mob, forefoot mob, talus mob; DTM to L lateral gastroc/peroneal)    CURRENT STATUS  Pt has had a good tolerance to physical therapy treatment. Pain level 0/10 with all functional mobility activities. Ankle DF ROM AROM/PROM: 15/19. Reports ~95% overall improvement with all functional mobility activities. Patient is able to perform single (L) HR 10x. Patient reported that she was able to ambulate greater than community distances without pain or difficulty, but not > than an hour secondary to feeling fatigue of foot. Patient was able to negotiate one flight of stairs with reciprocal pattern and unilateral handrail. Patient presented with Good mobility of forefoot and rear foot. Slight drop of great toe with subtalar neutral, rearfoot neutral.  Patient was independent and compliant with HEP. Goal/Measure of Progress Goal Met? 1. Pt will improve FOTO score to >/= 75 to demo a significant improvement in functional activity tolerance   Status at last Eval: 74 Current Status: 79 yes   2. Pt will achieve >/= +5 GROC in order to promote increased activity tolerance. Status at last Eval: +5 a good deal better Current Status: +7 a very great deal better yes   3.   Patient to demonstrate 30 sec of L SLS on firm surface in order to improve ease with ambulation on uneven terrains. Status at last Eval: unable Current Status: achieved Yes     4. Pt will increase L ankle strength to 5/5 PF in order to promote pain free, reciprocal gait pattern descending flight of stairs.    Status at last Eval: PF 4/5 Current Status: achieved yes       RECOMMENDATIONS  Discharge from physical therapy treatment with HEP  Specifics: program completed, goals achieved. If you have any questions/comments please contact us directly at [phone]. Thank you for allowing us to assist in the care of your patient.     LPTA Signature: Irvin Richards PTA Date: 12/18/2018   PT Signature: AIRAM Shea Time: 6:34 AM